# Patient Record
Sex: FEMALE | Race: WHITE | NOT HISPANIC OR LATINO | Employment: FULL TIME | URBAN - METROPOLITAN AREA
[De-identification: names, ages, dates, MRNs, and addresses within clinical notes are randomized per-mention and may not be internally consistent; named-entity substitution may affect disease eponyms.]

---

## 2025-03-05 ENCOUNTER — HOSPITAL ENCOUNTER (EMERGENCY)
Facility: HOSPITAL | Age: 70
Discharge: HOME | End: 2025-03-05
Payer: COMMERCIAL

## 2025-03-05 VITALS
DIASTOLIC BLOOD PRESSURE: 86 MMHG | OXYGEN SATURATION: 100 % | HEART RATE: 59 BPM | RESPIRATION RATE: 16 BRPM | BODY MASS INDEX: 18.36 KG/M2 | SYSTOLIC BLOOD PRESSURE: 129 MMHG | WEIGHT: 117 LBS | HEIGHT: 67 IN | TEMPERATURE: 97.3 F

## 2025-03-05 DIAGNOSIS — S01.81XA FACIAL LACERATION, INITIAL ENCOUNTER: Primary | ICD-10-CM
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
## 2025-03-05 PROCEDURE — 12011 RPR F/E/E/N/L/M 2.5 CM/<: CPT

## 2025-03-05 PROCEDURE — 99283 EMERGENCY DEPT VISIT LOW MDM: CPT

## 2025-03-05 PROCEDURE — 99284 EMERGENCY DEPT VISIT MOD MDM: CPT

## 2025-03-05 PROCEDURE — 2500000004 HC RX 250 GENERAL PHARMACY W/ HCPCS (ALT 636 FOR OP/ED)

## 2025-03-05 RX ORDER — LIDOCAINE HYDROCHLORIDE 10 MG/ML
5 INJECTION, SOLUTION INFILTRATION; PERINEURAL ONCE
Status: COMPLETED | OUTPATIENT
Start: 2025-03-05 | End: 2025-03-05

## 2025-03-05 RX ADMIN — LIDOCAINE HYDROCHLORIDE 5 ML: 10 INJECTION, SOLUTION INFILTRATION; PERINEURAL at 11:30

## 2025-03-05 ASSESSMENT — PAIN SCALES - GENERAL
PAINLEVEL_OUTOF10: 0 - NO PAIN
PAINLEVEL_OUTOF10: 0 - NO PAIN

## 2025-03-05 ASSESSMENT — PAIN - FUNCTIONAL ASSESSMENT
PAIN_FUNCTIONAL_ASSESSMENT: 0-10
PAIN_FUNCTIONAL_ASSESSMENT: 0-10

## 2025-03-05 ASSESSMENT — COLUMBIA-SUICIDE SEVERITY RATING SCALE - C-SSRS
1. IN THE PAST MONTH, HAVE YOU WISHED YOU WERE DEAD OR WISHED YOU COULD GO TO SLEEP AND NOT WAKE UP?: NO
2. HAVE YOU ACTUALLY HAD ANY THOUGHTS OF KILLING YOURSELF?: NO
6. HAVE YOU EVER DONE ANYTHING, STARTED TO DO ANYTHING, OR PREPARED TO DO ANYTHING TO END YOUR LIFE?: NO

## 2025-03-05 NOTE — ED TRIAGE NOTES
Pt states she was at work at [employer] and tripped and fell onto chin. ~4 cm lac noted to chin. Bleeding controlled. -LOC, -BT. Pt denies any pain at this time. Pt states she is up to date on tetanus.

## 2025-03-05 NOTE — ED PROVIDER NOTES
HPI   Chief Complaint   Patient presents with    Fall    Laceration       Patient is a 69-year-old female presenting to the ED with a laceration.  Patient states she tripped and fell while volunteering this morning.  She fell onto her chin and now has a gash in the area.  Patient denies hitting any part of her head.  She did not lose consciousness.  Patient is not on blood thinners.  She denies any symptoms at this time consisting of headache, dizziness, lightheadedness, vision changes, back pain, body pain, confusion, disorientation, or weakness.  She is eager to receive stitches and go back to her job.  Patient does tell me she is up-to-date with her Tdap due to tripping and falling in 2021 and she received it at that time.              Patient History   Past Medical History:   Diagnosis Date    Hypertension      No past surgical history on file.  No family history on file.  Social History     Tobacco Use    Smoking status: Never    Smokeless tobacco: Never   Vaping Use    Vaping status: Never Used   Substance Use Topics    Alcohol use: Never    Drug use: Never       Physical Exam   ED Triage Vitals [03/05/25 1052]   Temperature Heart Rate Respirations BP   36.3 °C (97.3 °F) 74 16 141/78      Pulse Ox Temp Source Heart Rate Source Patient Position   96 % Temporal Monitor Sitting      BP Location FiO2 (%)     Right arm --       Physical Exam  Vitals reviewed.   Constitutional:       General: She is not in acute distress.     Appearance: She is not ill-appearing.   HENT:      Head: Normocephalic and atraumatic.   Cardiovascular:      Rate and Rhythm: Normal rate.   Pulmonary:      Effort: Pulmonary effort is normal. No respiratory distress.   Musculoskeletal:      Comments: Full ROM of all extremities.  No reproducible TTP throughout the spine.   Skin:     General: Skin is warm and dry.      Comments: ~ 2 cm linear laceration to the inferior aspect of the chin.  No active bleeding.   Neurological:      General: No  focal deficit present.      Mental Status: She is alert.           ED Course & MDM   Diagnoses as of 03/05/25 1205   Facial laceration, initial encounter   Fall, initial encounter                 No data recorded     Jeremias Coma Scale Score: 15 (03/05/25 1057 : Nadia Boyer RN)                           Medical Decision Making  Patient is a 69-year-old female presenting to the ED with a laceration.  History was obtained from the patient.  Tripped and fell this morning and hit her chin.  Has a laceration to the area.  Did not hit head or lose consciousness.  Not on thinners.  Not having any pain or other symptoms, as noted in HPI.  States she is up-to-date with her Tdap and received in 2021.  On physical exam, patient is sitting comfortably and in no apparent distress.  Head is normocephalic and atraumatic.  No reproducible TTP throughout the spine.  There is a 2 cm linear laceration to the inferior aspect of the chin.  No active bleeding.  Remainder of exam as noted above.  Vital signs are stable.    Laceration cleaned with tap water and Betadine following which 7 sutures were placed on the area.  Please see the procedure note for details.  Patient tolerated this well without immediate complication.  She then remained stable and ready for discharge.  Emphasized the importance she keep the area clean and dry.  Recommended she place Bacitracin/Neosporin ointment over top of the site a few times per day as this will help promote healing.  She is to have the sutures removed in 5 days.  Strict return precautions were discussed for which she verbalized understanding.  Work note provided.  Patient is agreeable to the plan and her questions were answered to satisfaction.  She was discharged in stable condition.        Procedure  Laceration Repair    Performed by: Sylvie Blanton PA-C  Authorized by: Sylvie Blanton PA-C    Consent:     Consent obtained:  Verbal    Consent given by:  Patient  Anesthesia:      Anesthesia method:  Local infiltration    Local anesthetic:  Lidocaine 1% w/o epi  Laceration details:     Location: chin.    Length (cm):  2  Pre-procedure details:     Preparation:  Patient was prepped and draped in usual sterile fashion  Treatment:     Area cleansed with:  Povidone-iodine    Amount of cleaning:  Standard    Irrigation solution:  Tap water    Irrigation method:  Syringe  Skin repair:     Repair method:  Sutures    Suture size:  6-0    Suture material:  Prolene    Suture technique:  Simple interrupted    Number of sutures:  7  Approximation:     Approximation:  Close  Repair type:     Repair type:  Simple  Post-procedure details:     Dressing:  Open (no dressing)    Procedure completion:  Tolerated well, no immediate complications       Sylvie Blanton PA-C  03/05/25 1203

## 2025-03-05 NOTE — DISCHARGE INSTRUCTIONS
You received 7 stitches.  Please keep this area very clean and dry.  I would recommend placing bacitracin/Neosporin ointment over top the area a few times per day as this is an antibiotic ointment that helps it heal.  Your stitches are to come out in 5 days.  You can present to any primary care, urgent care, or emergency room to have this happen.  Keep in mind the signs/symptoms of infection you are educated on that would warrant returning to the ED.

## 2025-03-05 NOTE — Clinical Note
Radha Tate was seen and treated in our emergency department on 3/5/2025.  She may return to work on 03/06/2025.       If you have any questions or concerns, please don't hesitate to call.      Sylvie Blanton PA-C

## 2025-03-09 ENCOUNTER — HOSPITAL ENCOUNTER (EMERGENCY)
Facility: HOSPITAL | Age: 70
Discharge: HOME | End: 2025-03-09
Attending: EMERGENCY MEDICINE
Payer: COMMERCIAL

## 2025-03-09 VITALS
BODY MASS INDEX: 18.36 KG/M2 | OXYGEN SATURATION: 99 % | HEIGHT: 67 IN | RESPIRATION RATE: 18 BRPM | HEART RATE: 67 BPM | WEIGHT: 117 LBS | SYSTOLIC BLOOD PRESSURE: 134 MMHG | TEMPERATURE: 100.2 F | DIASTOLIC BLOOD PRESSURE: 70 MMHG

## 2025-03-09 DIAGNOSIS — Z48.02 VISIT FOR SUTURE REMOVAL: Primary | ICD-10-CM

## 2025-03-09 PROCEDURE — 99281 EMR DPT VST MAYX REQ PHY/QHP: CPT | Performed by: EMERGENCY MEDICINE

## 2025-03-09 ASSESSMENT — COLUMBIA-SUICIDE SEVERITY RATING SCALE - C-SSRS
6. HAVE YOU EVER DONE ANYTHING, STARTED TO DO ANYTHING, OR PREPARED TO DO ANYTHING TO END YOUR LIFE?: NO
2. HAVE YOU ACTUALLY HAD ANY THOUGHTS OF KILLING YOURSELF?: NO
1. IN THE PAST MONTH, HAVE YOU WISHED YOU WERE DEAD OR WISHED YOU COULD GO TO SLEEP AND NOT WAKE UP?: NO

## 2025-03-09 ASSESSMENT — LIFESTYLE VARIABLES
TOTAL SCORE: 0
EVER FELT BAD OR GUILTY ABOUT YOUR DRINKING: NO
HAVE YOU EVER FELT YOU SHOULD CUT DOWN ON YOUR DRINKING: NO
EVER HAD A DRINK FIRST THING IN THE MORNING TO STEADY YOUR NERVES TO GET RID OF A HANGOVER: NO
HAVE PEOPLE ANNOYED YOU BY CRITICIZING YOUR DRINKING: NO

## 2025-03-09 ASSESSMENT — PAIN SCALES - GENERAL: PAINLEVEL_OUTOF10: 0 - NO PAIN

## 2025-03-09 ASSESSMENT — PAIN DESCRIPTION - PAIN TYPE: TYPE: ACUTE PAIN

## 2025-03-09 ASSESSMENT — PAIN - FUNCTIONAL ASSESSMENT: PAIN_FUNCTIONAL_ASSESSMENT: 0-10

## 2025-03-09 NOTE — DISCHARGE INSTRUCTIONS
Your sutures were removed in the ED.  It does not look infected.  There might be some bleeding but your wound will fully heal and a week.  Continue putting Neosporin over it, you can wash it with soap and water.  You can put the Band-Aid over it to keep dirt out of the wound at work.     Please see your PCP in 1 week if the wound still does not heal, if you develop any fevers or chills, and if you have any other concerns.    Return to the ED if your wound does not heal in a week, if it is red and pus is draining, if it continues to bleed, and if you have any pain on chewing, difficulty breathing, fever greater than 101 °F for more than 24 hours and does not get better with Tylenol or Motrin, and if you have any other concerns.

## 2025-03-09 NOTE — ED PROVIDER NOTES
EMERGENCY DEPARTMENT ENCOUNTER      Pt Name: Radha Tate  MRN: [id number]  Birthdate 1955  Date of evaluation: 3/9/2025  Provider: Leeanne Toribio MD    CHIEF COMPLAINT       Chief Complaint   Patient presents with    Wound Check     Stitches placed on chin Wednesday morning following fall. Per patient, she was recommended to come back to ER to have stitches checked due to laceration being so deep. Denies bleeding, discharge, fever/chills. Stitches intact in triage     HISTORY OF PRESENT ILLNESS    Radha Tate is a 69 y.o. year old female who presents to the ER for removal.  The patient fell on Wednesday on concrete and had a chin laceration.  She came to the ED to have her sutures checked and moved.  The patient denies any bleeding discharge, fevers or chills.     PAST MEDICAL HISTORY     Past Medical History:   Diagnosis Date    Hypertension      CURRENT MEDICATIONS       Previous Medications    No medications on file     SURGICAL HISTORY     History reviewed. No pertinent surgical history.  ALLERGIES     Patient has no known allergies.  FAMILY HISTORY     No family history on file.  SOCIAL HISTORY       Social History     Tobacco Use    Smoking status: Never    Smokeless tobacco: Never   Vaping Use    Vaping status: Never Used   Substance Use Topics    Alcohol use: Never    Drug use: Never     PHYSICAL EXAM  (up to 7 for level 4, 8 or more for level 5)     ED Triage Vitals [03/09/25 0909]   Temperature Heart Rate Respirations BP   37.9 °C (100.2 °F) 67 18 134/70      Pulse Ox Temp Source Heart Rate Source Patient Position   99 % Temporal Monitor Sitting      BP Location FiO2 (%)     Right arm --       Physical Exam  Constitutional:       General: She is not in acute distress.     Appearance: She is normal weight.   HENT:      Head:      Comments: 7 sutures in place under the chin.  No dehiscence or purulent discharge from the laceration.     Mouth/Throat:      Mouth: Mucous membranes are moist.  "  Cardiovascular:      Rate and Rhythm: Normal rate and regular rhythm.      Pulses: Normal pulses.      Heart sounds: Normal heart sounds.   Pulmonary:      Effort: Pulmonary effort is normal.      Breath sounds: Normal breath sounds.   Abdominal:      General: Abdomen is flat.      Palpations: Abdomen is soft.   Musculoskeletal:         General: No swelling or signs of injury.      Cervical back: Normal range of motion and neck supple.   Skin:     General: Skin is warm and dry.      Capillary Refill: Capillary refill takes less than 2 seconds.   Neurological:      General: No focal deficit present.      Mental Status: She is alert.        DIAGNOSTIC RESULTS   LABS:  Labs Reviewed - No data to display  All other labs were within normal range or not returned as of this dictation.  Imaging  No orders to display      Procedure  Procedures  EMERGENCY DEPARTMENT COURSE/MDM:   Medical Decision Making    Vitals:    Vitals:    03/09/25 0909   BP: 134/70   BP Location: Right arm   Patient Position: Sitting   Pulse: 67   Resp: 18   Temp: 37.9 °C (100.2 °F)   TempSrc: Temporal   SpO2: 99%   Weight: 53.1 kg (117 lb)   Height: 1.702 m (5' 7\")     Radha Tate is a female 69 y.o. who presents to the ER for suture removal. On arrival the patients vital signs were: Afebrile, regular heart rate, normotensive, regular respiration rate, normoxic on room air. History obtained from: patient.     Exam significant for 7 sutures being intact.  The laceration is well-approximated on the chin.  On gentle traction of the laceration, there is no dehiscence, bleeding or purulent discharge.  The sutures were removed, the patient tolerated it well.  Laceration was still well-approximated when sutures were removed.    The patient was discharged and given return precautions. The patient was instructed to follow up with their PCP in one week. The patient understood and was agreeable with the plan.       Diagnoses as of 03/09/25 0958   Visit for " suture removal           Shared decision making for disposition  Patient and/or patient´s representative was counseled regarding labs, imaging, likely diagnosis. All questions were answered. Recommendation was made   for discharge home. The patient agreed and was discharged home in stable condition with appropriate relevant educational materials. Return precautions were provided which included Increasing redness, increasing warmth to touch, milky foul smelling drainage from your wound, fever, or worsening symptoms.     ED Medications administered this visit:  Medications - No data to display    New Prescriptions from this visit:    New Prescriptions    No medications on file       Follow-up:  Geronimo Mtz MD   [id number]  [facility] [id number]  [phone]    In 1 week          Final Impression:   1. Visit for suture removal          Please excuse any misspellings or unintended errors related to the Dragon speech recognition software used to dictate this note.    I reviewed the case with the attending ED physician. The attending ED physician agrees with the plan.      Leeanne Toribio MD  Resident  03/09/25 0934